# Patient Record
Sex: FEMALE | Race: WHITE | Employment: FULL TIME | URBAN - METROPOLITAN AREA
[De-identification: names, ages, dates, MRNs, and addresses within clinical notes are randomized per-mention and may not be internally consistent; named-entity substitution may affect disease eponyms.]

---

## 2017-06-01 ENCOUNTER — OFFICE VISIT (OUTPATIENT)
Dept: INTERNAL MEDICINE | Facility: CLINIC | Age: 27
End: 2017-06-01
Payer: COMMERCIAL

## 2017-06-01 VITALS
SYSTOLIC BLOOD PRESSURE: 117 MMHG | HEIGHT: 66 IN | TEMPERATURE: 98.3 F | DIASTOLIC BLOOD PRESSURE: 79 MMHG | BODY MASS INDEX: 21.9 KG/M2 | OXYGEN SATURATION: 95 % | WEIGHT: 136.25 LBS | HEART RATE: 65 BPM

## 2017-06-01 DIAGNOSIS — K21.9 GERD WITHOUT ESOPHAGITIS: Primary | ICD-10-CM

## 2017-06-01 PROCEDURE — 99203 OFFICE O/P NEW LOW 30 MIN: CPT | Performed by: NURSE PRACTITIONER

## 2017-06-01 NOTE — NURSING NOTE
"Chief Complaint   Patient presents with     New Patient     establish care       Initial /79 (BP Location: Right arm, Patient Position: Chair, Cuff Size: Adult Regular)  Pulse 65  Temp 98.3  F (36.8  C) (Oral)  Ht 5' 6\" (1.676 m)  Wt 136 lb 4 oz (61.8 kg)  SpO2 95%  BMI 21.99 kg/m2 Estimated body mass index is 21.99 kg/(m^2) as calculated from the following:    Height as of this encounter: 5' 6\" (1.676 m).    Weight as of this encounter: 136 lb 4 oz (61.8 kg).  Medication Reconciliation: complete     Lexus Mcdonald MA    "

## 2017-06-01 NOTE — MR AVS SNAPSHOT
"              After Visit Summary   2017    Jyotsna Park    MRN: [id number]           Patient Information     Date Of Birth          1990        Visit Information        Provider Department      2017 9:20 AM Asha Toure NP [facility]        Today's Diagnoses     GERD without esophagitis    -  1       Follow-ups after your visit        Future tests that were ordered for you today     Open Future Orders        Priority Expected Expires Ordered    H Pylori antigen stool Routine  2017            Who to contact     If you have questions or need follow up information about today's clinic visit or your schedule please contact [facility] directly at [phone].  Normal or non-critical lab and imaging results will be communicated to you by MyChart, letter or phone within 4 business days after the clinic has received the results. If you do not hear from us within 7 days, please contact the clinic through MyChart or phone. If you have a critical or abnormal lab result, we will notify you by phone as soon as possible.  Submit refill requests through [employer] or call your pharmacy and they will forward the refill request to us. Please allow 3 business days for your refill to be completed.          Additional Information About Your Visit        MyChart Information     [employer] lets you send messages to your doctor, view your test results, renew your prescriptions, schedule appointments and more. To sign up, go to [email or web address] . Click on \"Log in\" on the left side of the screen, which will take you to the Welcome page. Then click on \"Sign up Now\" on the right side of the page.     You will be asked to enter the access code listed below, as well as some personal information. Please follow the directions to create your username and password.     Your access code is: [email or web address]  Expires: 2017 10:26 AM     Your access code will  in 90 days. " "If you need help or a new code, please call your [facility] or [phone].        Care EveryWhere ID     This is your Care EveryWhere ID. This could be used by other organizations to access your [facility] medical records  [id number]        Your Vitals Were     Pulse Temperature Height Pulse Oximetry BMI (Body Mass Index)       65 98.3  F (36.8  C) (Oral) 5' 6\" (1.676 m) 95% 21.99 kg/m2        Blood Pressure from Last 3 Encounters:   06/01/17 117/79    Weight from Last 3 Encounters:   06/01/17 136 lb 4 oz (61.8 kg)                 Today's Medication Changes          These changes are accurate as of: 6/1/17 10:26 AM.  If you have any questions, ask your nurse or doctor.               Start taking these medicines.        Dose/Directions    omeprazole 20 MG CR capsule   Commonly known as:  priLOSEC   Used for:  GERD without esophagitis   Started by:  Asha Toure NP        Dose:  20 mg   Take 1 capsule (20 mg) by mouth daily   Quantity:  90 capsule   Refills:  1            Where to get your medicines      These medications were sent to [facility] [address] [place] [address]   [address] [facility] [address]     Phone:  [phone]     omeprazole 20 MG CR capsule                Primary Care Provider Office Phone # Fax #    Asha Toure [phone]       [facility] [address] [place] [address]        Thank you!     Thank you for choosing [facility]  for your care. Our goal is always to provide you with excellent care. Hearing back from our patients is one way we can continue to improve our services. Please take a few minutes to complete the written survey that you may receive in the mail after your visit with us. Thank you!             Your Updated Medication List - Protect others around you: Learn how to safely use, store and throw away your medicines at www.disposemymeds.org.          This list is accurate as of: " 6/1/17 10:26 AM.  Always use your most recent med list.                   Brand Name Dispense Instructions for use    omeprazole 20 MG CR capsule    priLOSEC    90 capsule    Take 1 capsule (20 mg) by mouth daily       ZOLOFT PO      Take 25 mg by mouth daily

## 2017-06-01 NOTE — PROGRESS NOTES
"  SUBJECTIVE:                                                    Jyotsna Park is a 27 year old female who presents to clinic today for the following health issues:    Presents with mother and boyfriend    New Patient/Transfer of Care    GERD/Heartburn      Duration: \"years\"    Description (location/character/radiation): sharp epigastric pain after overeating, fatty/fried foods, ETOH, mom reports pt will curl into fetal position.    Intensity:  moderate, severe    Accompanying signs and symptoms:  food getting stuck: no   nausea/vomiting/blood: no   abdominal pain: YES- epigastric, does not radiate  black/tarry or bloody stools: no :    History (similar episodes/previous evaluation): None    Precipitating or alleviating factors:  worse with fatty foods and alcohol.  current NSAID/Aspirin use: no     Therapies tried and outcome: none       Patient Active Problem List   Diagnosis     Depressive disorder     GERD without esophagitis     History reviewed. No pertinent surgical history.    Social History   Substance Use Topics     Smoking status: Never Smoker     Smokeless tobacco: Not on file     Alcohol use Yes      Comment: 7 per week     Family History   Problem Relation Age of Onset     CEREBROVASCULAR DISEASE Father      Substance Abuse Father      Asthma Father      Hypertension Maternal Grandmother      CEREBROVASCULAR DISEASE Maternal Grandmother      Hyperlipidemia Maternal Grandmother      Substance Abuse Maternal Grandmother      Hypertension Maternal Grandfather      CEREBROVASCULAR DISEASE Maternal Grandfather      Hyperlipidemia Maternal Grandfather      DIABETES Maternal Grandfather      Hyperlipidemia Paternal Grandmother      CEREBROVASCULAR DISEASE Paternal Grandmother      Asthma Paternal Grandmother      Substance Abuse Paternal Grandfather      Other Cancer Paternal Grandfather      Asthma Paternal Grandfather          Current Outpatient Prescriptions   Medication Sig Dispense Refill     " "omeprazole (PRILOSEC) 20 MG CR capsule Take 1 capsule (20 mg) by mouth daily 90 capsule 1     Sertraline HCl (ZOLOFT PO) Take 25 mg by mouth daily       BP Readings from Last 3 Encounters:   06/01/17 117/79    Wt Readings from Last 3 Encounters:   06/01/17 136 lb 4 oz (61.8 kg)                    Reviewed and updated as needed this visit by clinical staff  Tobacco  Allergies       Reviewed and updated as needed this visit by Provider         ROS:  C: NEGATIVE for fever, chills, change in weight  E/M: NEGATIVE for ear, mouth and throat problems  R: NEGATIVE for significant cough or SOB  CV: NEGATIVE for chest pain, palpitations or peripheral edema  : normal menstrual cycles  MUSCULOSKELETAL: NEGATIVE for significant arthralgias or myalgia  HEME/ALLERGY/IMMUNE: NEGATIVE for bleeding problems  ROS otherwise negative    OBJECTIVE:                                                    /79 (BP Location: Right arm, Patient Position: Chair, Cuff Size: Adult Regular)  Pulse 65  Temp 98.3  F (36.8  C) (Oral)  Ht 5' 6\" (1.676 m)  Wt 136 lb 4 oz (61.8 kg)  SpO2 95%  BMI 21.99 kg/m2  Body mass index is 21.99 kg/(m^2).  GENERAL: healthy, alert and no distress  NECK: no adenopathy, no asymmetry, masses, or scars and thyroid normal to palpation  RESP: lungs clear to auscultation - no rales, rhonchi or wheezes  CV: regular rate and rhythm, normal S1 S2, no S3 or S4, no murmur, click or rub, no peripheral edema and peripheral pulses strong  ABDOMEN: soft, nontender, no hepatosplenomegaly, no masses and bowel sounds normal  MS: no gross musculoskeletal defects noted, no edema  PSYCH: mentation appears normal, affect normal/bright         ASSESSMENT/PLAN:                                                              ICD-10-CM    1. GERD without esophagitis K21.9 H Pylori antigen stool     omeprazole (PRILOSEC) 20 MG CR capsule       F/u pending lab results, PPI trial  Lifestyle changes  Consider EGD if not improving 2-4 " "weeks  Pt and boyfriend state comfort with plan, mom voices concerns about need for more testing, aggressive treatment, \"how about an ultrasound?\".  Reassured US not useful for GERD, gastritis management.      Asha Toure NP  [facility]    "

## 2017-06-07 ENCOUNTER — CARE COORDINATION (OUTPATIENT)
Dept: SURGERY | Facility: CLINIC | Age: 27
End: 2017-06-07

## 2017-06-07 ENCOUNTER — HOSPITAL ENCOUNTER (OUTPATIENT)
Dept: MRI IMAGING | Facility: CLINIC | Age: 27
Discharge: HOME | End: 2017-06-07
Attending: SURGERY | Admitting: SURGERY
Payer: COMMERCIAL

## 2017-06-07 DIAGNOSIS — R16.0 LIVER MASS: ICD-10-CM

## 2017-06-07 PROCEDURE — 74183 MRI ABD W/O CNTR FLWD CNTR: CPT

## 2017-06-07 PROCEDURE — 25500064 ZZH RX 255 OP 636: Performed by: SURGERY

## 2017-06-07 PROCEDURE — A9581 GADOXETATE DISODIUM INJ: HCPCS | Performed by: SURGERY

## 2017-06-07 RX ADMIN — GADOXETATE DISODIUM 6.5 ML: 181.43 INJECTION, SOLUTION INTRAVENOUS at 08:04

## 2017-06-07 NOTE — PROGRESS NOTES
Care Coordination Telephone Call  GI Service and Surgical Oncology    Called patient to discuss results of MRI at the request of Dr. Park.  I have read impression results to the patient.  We will see her in clinic on Friday at 1 pm and I have confirmed that appointment.    I have asked the patient to call with any additional questions or concerns and have provided my contact information.    Ana NARVAEZN, HNBC, STAR-T  RN Care Coordinator  Surgical Oncology and GI service  Ph: [phone]  FAX: [phone]

## 2017-06-09 ENCOUNTER — OFFICE VISIT (OUTPATIENT)
Dept: SURGERY | Facility: CLINIC | Age: 27
End: 2017-06-09
Attending: SURGERY
Payer: COMMERCIAL

## 2017-06-09 VITALS
HEART RATE: 60 BPM | OXYGEN SATURATION: 95 % | DIASTOLIC BLOOD PRESSURE: 66 MMHG | WEIGHT: 139.99 LBS | SYSTOLIC BLOOD PRESSURE: 113 MMHG | BODY MASS INDEX: 22.6 KG/M2 | TEMPERATURE: 98.4 F | RESPIRATION RATE: 16 BRPM

## 2017-06-09 DIAGNOSIS — K76.89 FOCAL NODULAR HYPERPLASIA OF LIVER: ICD-10-CM

## 2017-06-09 DIAGNOSIS — R16.0 LIVER MASS: Primary | ICD-10-CM

## 2017-06-09 PROCEDURE — 99212 OFFICE O/P EST SF 10 MIN: CPT

## 2017-06-09 ASSESSMENT — PAIN SCALES - GENERAL: PAINLEVEL: NO PAIN (0)

## 2017-06-09 NOTE — MR AVS SNAPSHOT
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  She would like to call to schedule f/u MRI and I have asked her to let me know when this has been completed so that Dr. Park can review.    Ana Spears  BSN, HNBC, STAR-T  RN Care Coordinator  Ph: [phone]  FAX: [phone]            Follow-ups after your visit        Follow-up notes from your care team     Return in about 3 months (around 9/9/2017).      Who to contact     If you have questions or need follow up information about today's clinic visit or your schedule please contact [facility] directly at [phone].  Normal or non-critical lab and imaging results will be communicated to you by [employer], letter or phone within 4 business days after the clinic has received the results. If you do not hear from us within 7 days, please contact the clinic through [employer] or phone. If you have a critical or abnormal lab result, we will notify you by phone as soon as possible.  Submit refill requests through [employer] or call your pharmacy and they will forward the refill request to us. Please allow 3 business days for your refill to be completed.          Additional Information About Your Visit        [employer] Information     [employer] lets you send messages to your doctor, view your test results, renew your prescriptions, schedule appointments and more. To sign up, go to [email or web address] . Click on \"Log in\" on the left side of the screen, which will take you to the Welcome page. Then click on \"Sign up Now\" on the right side of the " page.     You will be asked to enter the access code listed below, as well as some personal information. Please follow the directions to create your username and password.     Your access code is: [email or web address]  Expires: 2017 10:26 AM     Your access code will  in 90 days. If you need help or a new code, please call your [facility] or [phone].        Care EveryWhere ID     This is your Care EveryWhere ID. This could be used by other organizations to access your [facility] medical records  [id number]        Your Vitals Were     Pulse Temperature Respirations Pulse Oximetry BMI (Body Mass Index)       60 98.4  F (36.9  C) (Oral) 16 95% 22.6 kg/m2        Blood Pressure from Last 3 Encounters:   17 113/66   17 117/79    Weight from Last 3 Encounters:   17 63.5 kg (139 lb 15.9 oz)   17 61.8 kg (136 lb 4 oz)                 Today's Medication Changes          These changes are accurate as of: 17 11:59 PM.  If you have any questions, ask your nurse or doctor.               Stop taking these medicines if you haven't already. Please contact your care team if you have questions.     omeprazole 20 MG CR capsule   Commonly known as:  priLOSEC   Stopped by:  Barron Park MD                    Primary Care Provider Office Phone # Fax #    Asha Toure [phone]       [facility] [address] [place] [address]        Thank you!     Thank you for choosing [facility] [place]  for your care. Our goal is always to provide you with excellent care. Hearing back from our patients is one way we can continue to improve our services. Please take a few minutes to complete the written survey that you may receive in the mail after your visit with us. Thank you!             Your Updated Medication List - Protect others around you: Learn how to safely use, store and throw away your medicines at www.disposemymeds.org.          This  list is accurate as of: 6/9/17 11:59 PM.  Always use your most recent med list.                   Brand Name Dispense Instructions for use    ZOLOFT PO      Take 25 mg by mouth daily

## 2017-06-09 NOTE — LETTER
6/9/2017       RE: Jyotsna Park  [address] [address]     Dear Colleague,    Thank you for referring your patient, Jyotsna Park, to the [facility]. Please see a copy of my visit note below.    REASON FOR EVALUATION:  Newly identified liver mass consistent with focal nodular hyperplasia.      HISTORY:  Ms. 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  She subsequently underwent an MRI, which was performed a couple of days ago and reveals a large mass arising in the left lateral segment of the liver consistent with focal nodular hyperplasia.  The mass on my review measures about 8.6 cm in greatest dimension when viewed on the coronal sections.  On axial imaging, the mass measured 7 x 5.7 cm in greatest dimension.  The findings were read as consistent with focal nodular hyperplasia, including findings of a central scar.  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      With regards to symptoms, Ms. Park does have occasional abdominal pains after eating a lot or consuming too much alcohol.  She has taken some omeprazole in the past, but essentially states that those symptoms go away if she does overeat or over drink.  She does not have any chronic pain or other findings that seem consistent with symptoms from focal nodular hyperplasia.      PAST MEDICAL HISTORY:  The patient takes Zoloft for depression.      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  She is a nonsmoker and uses alcohol  socially.      PHYSICAL EXAMINATION:   HEENT:  The sclerae are nonicteric.   SKIN:  Not jaundiced.   ABDOMEN:  Soft and benign.  She has some minimal tenderness on deep palpation of the left upper quadrant, but I cannot palpate the mass that is seen on MRI, and overall her exam is benign.      ASSESSMENT AND PLAN:  Ms. Park is a 27-year-old female with a newly identified focal nodular hyperplasia of the liver.  In our discussion today she states that she was previously taking oral contraceptive pills, but I did discuss with her that focal nodular hyperplasias are not typically estrogen sensitive, and I think it would be reasonable to continue those if she desired.  With regard to followup and/or management of this lesion, I did discuss clearly with Ms. Park that normally I would tell patients that focal nodular hyperplasia that is asymptomatic really does not need surveillance; however, because she has had abdominal imaging in the past which did not show any lesion in the liver, I think followup in her case is reasonable, only to be sure that this is not a rapidly growing lesion.  I briefly touched on the possibility of a much more rare condition, such as fibrolamellar variant of hepatocellular cancer, although this lesion really did not have any of those features and meets the classic criteria based on radiologic interpretation for focal nodular hyperplasia.  Based on that, we discussed and agreed together that we would do short-term followup in 3 months with a repeat MRI just to be certain that rapid growth is not occurring.  After that, I think I would probably check her 1 year later and maybe for a few years thereafter just to be sure that this lesion remains stable.  If so, no surgical intervention will be necessary.  If, however, the lesion does show evidence of rapid growth, I recommended towards removal, which she is agreeable to.  On that note, I did briefly discuss the possibility of a laparoscopic  left lateral segment liver resection with the patient just so she would be aware of those details.      I will look forward to seeing Ms. Park again in about 3 months' time with a repeat MRI for her newly identified focal nodular hyperplasia of the liver.        A total of 45 minutes was spent with Ms. Park and her mom today, all of which was in consultation.         Again, thank you for allowing me to participate in the care of your patient.      Sincerely,    Barron Park MD

## 2017-06-09 NOTE — PATIENT INSTRUCTIONS
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  She would like to call to schedule f/u MRI and I have asked her to let me know when this has been completed so that Dr. Park can review.    Ana NARVAEZN, [facility], STAR-T  RN Care Coordinator  Ph: [phone]  FAX: [phone]

## 2017-06-09 NOTE — PROGRESS NOTES
REASON FOR EVALUATION:  Newly identified liver mass consistent with focal nodular hyperplasia.      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  She subsequently underwent an MRI, which was performed a couple of days ago and reveals a large mass arising in the left lateral segment of the liver consistent with focal nodular hyperplasia.  The mass on my review measures about 8.6 cm in greatest dimension when viewed on the coronal sections.  On axial imaging, the mass measured 7 x 5.7 cm in greatest dimension.  The findings were read as consistent with focal nodular hyperplasia, including findings of a central scar.  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      With regards to symptoms, Ms. Park does have occasional abdominal pains after eating a lot or consuming too much alcohol.  She has taken some omeprazole in the past, but essentially states that those symptoms go away if she does overeat or over drink.  She does not have any chronic pain or other findings that seem consistent with symptoms from focal nodular hyperplasia.      PAST MEDICAL HISTORY:  The patient takes Zoloft for depression.      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  She is a nonsmoker and uses alcohol socially.      PHYSICAL EXAMINATION:   HEENT:  The sclerae are nonicteric.   SKIN:  Not jaundiced.   ABDOMEN:  Soft and benign.  She has some minimal tenderness on deep palpation of the left upper quadrant, but I cannot palpate the  mass that is seen on MRI, and overall her exam is benign.      ASSESSMENT AND PLAN:  Ms. Park is a 27-year-old female with a newly identified focal nodular hyperplasia of the liver.  In our discussion today she states that she was previously taking oral contraceptive pills, but I did discuss with her that focal nodular hyperplasias are not typically estrogen sensitive, and I think it would be reasonable to continue those if she desired.  With regard to followup and/or management of this lesion, I did discuss clearly with Ms. Park that normally I would tell patients that focal nodular hyperplasia that is asymptomatic really does not need surveillance; however, because she has had abdominal imaging in the past which did not show any lesion in the liver, I think followup in her case is reasonable, only to be sure that this is not a rapidly growing lesion.  I briefly touched on the possibility of a much more rare condition, such as fibrolamellar variant of hepatocellular cancer, although this lesion really did not have any of those features and meets the classic criteria based on radiologic interpretation for focal nodular hyperplasia.  Based on that, we discussed and agreed together that we would do short-term followup in 3 months with a repeat MRI just to be certain that rapid growth is not occurring.  After that, I think I would probably check her 1 year later and maybe for a few years thereafter just to be sure that this lesion remains stable.  If so, no surgical intervention will be necessary.  If, however, the lesion does show evidence of rapid growth, I recommended towards removal, which she is agreeable to.  On that note, I did briefly discuss the possibility of a laparoscopic left lateral segment liver resection with the patient just so she would be aware of those details.      I will look forward to seeing Ms. Park again in about 3 months' time with a repeat MRI for her newly identified focal nodular  hyperplasia of the liver.        A total of 45 minutes was spent with Ms. Park and her mom today, all of which was in consultation.

## 2017-08-22 ENCOUNTER — TELEPHONE (OUTPATIENT)
Dept: INTERNAL MEDICINE | Facility: CLINIC | Age: 27
End: 2017-08-22

## 2017-08-22 NOTE — LETTER
[facility]  [address], [address]  [place], [place]  [address]                                            TEL:[phone]  FAX:[phone]      Jyotsna Park  [address] [address]      September 11, 2017    Dear Jyotsna,          At [facility], we care about your health and well-being. A review of your chart has indicated that you are due for a pap. Please contact us at [phone] to schedule an appointment.     If you have already had one or all of the above screening tests at another facility, please call us to update your chart.        Sincerely,      Asha Toure C.N.P.

## 2017-10-12 ENCOUNTER — CARE COORDINATION (OUTPATIENT)
Dept: SURGERY | Facility: CLINIC | Age: 27
End: 2017-10-12

## 2017-10-12 NOTE — PROGRESS NOTES
Surgical Oncology RN Care Coordination Note:     Called and left a message for patient to call back to discuss scheduling. Informed her in message that Dr. Park would recommend she reschedule appointment and follow up scan. Informed her our schedulers have been attempting to reach her to assist with this. Left my direct number for patient to call back and will mail a letter to patient with recommendations.     Susana Sanders RN, BSN  Care Coordinator - Dr. Park  [phone]

## 2017-10-12 NOTE — LETTER
October 12, 2017       TO: Jyotsna Park  [address] [facility] [address]         Dear Jyotsna,    We missed you at your appointment on 10/2/2017. Our records indicated you are overdue for follow up with Dr. Park with a repeat MRI scan. Dr. Park does recommend we reschedule these appointments. Our office has attempted to reach you on multiple occasions without success. If you would like to reschedule this appointment and MRI please contact our office at [phone] and ask to speak with Dr. Park's clinic scheduler.     If you have any questions regarding this letter or recommendations please contact our office directly at [phone].     Sincerely,    Susana Sanders RN, BSN  Care Coordinator - Dr. Park  [phone]

## 2017-12-18 ENCOUNTER — OFFICE VISIT (OUTPATIENT)
Dept: SURGERY | Facility: CLINIC | Age: 27
End: 2017-12-18
Attending: SURGERY
Payer: COMMERCIAL

## 2017-12-18 VITALS
HEART RATE: 55 BPM | TEMPERATURE: 98.4 F | WEIGHT: 138.23 LBS | DIASTOLIC BLOOD PRESSURE: 54 MMHG | OXYGEN SATURATION: 96 % | RESPIRATION RATE: 16 BRPM | BODY MASS INDEX: 22.31 KG/M2 | SYSTOLIC BLOOD PRESSURE: 92 MMHG

## 2017-12-18 DIAGNOSIS — K76.89 FOCAL NODULAR HYPERPLASIA OF LIVER: Primary | ICD-10-CM

## 2017-12-18 PROCEDURE — 99212 OFFICE O/P EST SF 10 MIN: CPT | Mod: ZF

## 2017-12-18 RX ORDER — DROSPIRENONE AND ETHINYL ESTRADIOL 0.02-3(28)
KIT ORAL
COMMUNITY
Start: 2017-12-13

## 2017-12-18 ASSESSMENT — PAIN SCALES - GENERAL: PAINLEVEL: NO PAIN (0)

## 2017-12-18 NOTE — PROGRESS NOTES
Looks great  Feels well  No new pains or problems  MRI with stable FNH    I think its OK to resume BCPs but non estrogen formulas may be preferred    Will plan US in 1 yr just to be sure no change on BCPs

## 2017-12-18 NOTE — MR AVS SNAPSHOT
After Visit Summary   12/18/2017    Jyotsna Park    MRN: [id number]           Patient Information     Date Of Birth          1990        Visit Information        Provider Department      12/18/2017 1:00 PM Barron Park MD [facility]        Today's Diagnoses     Focal nodular hyperplasia of liver    -  1       Follow-ups after your visit        Who to contact     If you have questions or need follow up information about today's clinic visit or your schedule please contact [facility] directly at [phone].  Normal or non-critical lab and imaging results will be communicated to you by Storitzhart, letter or phone within 4 business days after the clinic has received the results. If you do not hear from us within 7 days, please contact the clinic through Plickerst or phone. If you have a critical or abnormal lab result, we will notify you by phone as soon as possible.  Submit refill requests through Sokikom or call your pharmacy and they will forward the refill request to us. Please allow 3 business days for your refill to be completed.          Additional Information About Your Visit        MyChart Information     Sokikom gives you secure access to your electronic health record. If you see a primary care provider, you can also send messages to your care team and make appointments. If you have questions, please call your primary care clinic.  If you do not have a primary care provider, please call [phone] and they will assist you.        Care EveryWhere ID     This is your Care EveryWhere ID. This could be used by other organizations to access your [place] medical records  [id number]        Your Vitals Were     Pulse Temperature Respirations Pulse Oximetry BMI (Body Mass Index)       55 98.4  F (36.9  C) (Oral) 16 96% 22.31 kg/m2        Blood Pressure from Last 3 Encounters:   12/18/17 92/54   06/09/17 113/66   06/01/17 117/79    Weight from Last 3  Encounters:   12/18/17 62.7 kg (138 lb 3.7 oz)   06/09/17 63.5 kg (139 lb 15.9 oz)   06/01/17 61.8 kg (136 lb 4 oz)              Today, you had the following     No orders found for display       Primary Care Provider Office Phone # Fax #    Asha ToureMARTAH [phone]       [address] [facility] [address]        Equal Access to Services     [facility] : Hadii aad ku hadasho Soomaali, waaxda luqadaha, qaybta kaalmada adeegyada, waxay idiin hayaan adeeg kharash la'abelino . So [facility] [phone].    ATENCIÓN: Si habla español, tiene a welsh disposición servicios gratuitos de asistencia lingüística. [facility] [phone].    We comply with applicable federal civil rights laws and [place] laws. We do not discriminate on the basis of race, color, national origin, age, disability, sex, sexual orientation, or gender identity.            Thank you!     Thank you for choosing [facility]  for your care. Our goal is always to provide you with excellent care. Hearing back from our patients is one way we can continue to improve our services. Please take a few minutes to complete the written survey that you may receive in the mail after your visit with us. Thank you!             Your Updated Medication List - Protect others around you: Learn how to safely use, store and throw away your medicines at www.disposemymeds.org.          This list is accurate as of: 12/18/17  1:51 PM.  Always use your most recent med list.                   Brand Name Dispense Instructions for use Diagnosis    drospirenone-ethinyl estradiol 3-0.02 MG per tablet    IGNACIO     Take 1 pill by mouth continuous cycling for 3 months then withdrawl bleed.        ZOLOFT PO      Take 25 mg by mouth daily

## 2017-12-18 NOTE — NURSING NOTE
"Oncology Rooming Note    December 18, 2017 1:29 PM   Jyotsna Park is a 27 year old female who presents for:    Chief Complaint   Patient presents with     Oncology Clinic Visit     Retirm for Hepatic Mass      Initial Vitals: BP 92/54  Pulse 55  Temp 98.4  F (36.9  C) (Oral)  Resp 16  Wt 62.7 kg (138 lb 3.7 oz)  SpO2 96%  BMI 22.31 kg/m2 Estimated body mass index is 22.31 kg/(m^2) as calculated from the following:    Height as of 6/1/17: 1.676 m (5' 6\").    Weight as of this encounter: 62.7 kg (138 lb 3.7 oz). Body surface area is 1.71 meters squared.  No Pain (0) Comment: Data Unavailable   No LMP recorded. Patient is not currently having periods (Reason: Birth Control).  Allergies reviewed: Yes  Medications reviewed: Yes    Medications: Medication refills not needed today.  Pharmacy name entered into [facility]:    [facility] [address] [place], [place] - [address]  [facility] [address] [place] [facility] [address]    Clinical concerns: Results  Xavier  was notified.    6  minutes for nursing intake (face to face time)     Sherrill Munoz MA              "

## 2017-12-18 NOTE — Clinical Note
Patient needs 1 year follow up with Dr. Park with an US of the liver prior to appointment. Can be same day if needed.

## 2017-12-18 NOTE — LETTER
12/18/2017       RE: Jyotsna Park  [address]  [place] [facility] [address]     Dear Colleague,    Thank you for referring your patient, Jyotsna Park, to the [facility]. Please see a copy of my visit note below.    Looks great  Feels well  No new pains or problems  MRI with stable FNH    I think its OK to resume BCPs but non estrogen formulas may be preferred    Will plan US in 1 yr just to be sure no change on BCPs    Again, thank you for allowing me to participate in the care of your patient.      Sincerely,    Barron Park MD

## 2017-12-31 ENCOUNTER — HEALTH MAINTENANCE LETTER (OUTPATIENT)
Age: 27
End: 2017-12-31

## 2018-04-16 ENCOUNTER — TELEPHONE (OUTPATIENT)
Dept: INTERNAL MEDICINE | Facility: CLINIC | Age: 28
End: 2018-04-16

## 2018-04-16 NOTE — TELEPHONE ENCOUNTER
Panel Management Review      Patient has the following on her problem list: None      Composite cancer screening  Chart review shows that this patient is due/due soon for the following Pap Smear  Summary:    Patient is due/failing the following:   PAP    Action needed:   Patient needs office visit for Pap.    Type of outreach:    Sent letter.    Questions for provider review:    None                                                                                                                                    KENNEDY JOHNSON CMA       Chart routed to no one   .

## 2018-08-28 ENCOUNTER — TELEPHONE (OUTPATIENT)
Dept: INTERNAL MEDICINE | Facility: CLINIC | Age: 28
End: 2018-08-28

## 2018-08-28 NOTE — TELEPHONE ENCOUNTER
8/28/2018    Call Regarding Preventive Health Screening Cervical/PAP       Attempt 1    Message on voicemail     Comments:       Outreach   SV

## 2018-09-07 NOTE — TELEPHONE ENCOUNTER
9/7/2018    Call Regarding Preventive Health Screening Cervical/PAP    Attempt 2    Message on voicemail     Comments:       Outreach   CC

## 2018-10-31 NOTE — TELEPHONE ENCOUNTER
10/31/2018    Call Regarding Preventive Health Screening Cervical/PAP    Attempt 3    Message on voicemail     Comments:       Outreach   CWR

## 2018-11-14 ENCOUNTER — TELEPHONE (OUTPATIENT)
Dept: SURGERY | Facility: CLINIC | Age: 28
End: 2018-11-14

## 2018-12-27 ENCOUNTER — APPOINTMENT (OUTPATIENT)
Dept: CT IMAGING | Facility: CLINIC | Age: 28
End: 2018-12-27
Attending: EMERGENCY MEDICINE
Payer: COMMERCIAL

## 2018-12-27 ENCOUNTER — APPOINTMENT (OUTPATIENT)
Dept: ULTRASOUND IMAGING | Facility: CLINIC | Age: 28
End: 2018-12-27
Attending: EMERGENCY MEDICINE
Payer: COMMERCIAL

## 2018-12-27 ENCOUNTER — HOSPITAL ENCOUNTER (EMERGENCY)
Facility: CLINIC | Age: 28
Discharge: HOME | End: 2018-12-27
Attending: EMERGENCY MEDICINE | Admitting: EMERGENCY MEDICINE
Payer: COMMERCIAL

## 2018-12-27 VITALS
RESPIRATION RATE: 18 BRPM | WEIGHT: 135 LBS | HEART RATE: 67 BPM | DIASTOLIC BLOOD PRESSURE: 87 MMHG | BODY MASS INDEX: 21.69 KG/M2 | OXYGEN SATURATION: 98 % | SYSTOLIC BLOOD PRESSURE: 126 MMHG | TEMPERATURE: 98.6 F | HEIGHT: 66 IN

## 2018-12-27 DIAGNOSIS — N20.0 KIDNEY STONE: ICD-10-CM

## 2018-12-27 LAB
ALBUMIN UR-MCNC: 30 MG/DL
AMORPH CRY #/AREA URNS HPF: ABNORMAL /HPF
ANION GAP SERPL CALCULATED.3IONS-SCNC: 8 MMOL/L (ref 3–14)
APPEARANCE UR: ABNORMAL
BACTERIA #/AREA URNS HPF: ABNORMAL /HPF
BASOPHILS # BLD AUTO: 0 10E9/L (ref 0–0.2)
BASOPHILS NFR BLD AUTO: 0.3 %
BILIRUB UR QL STRIP: NEGATIVE
BUN SERPL-MCNC: 24 MG/DL (ref 7–30)
CALCIUM SERPL-MCNC: 9.1 MG/DL (ref 8.5–10.1)
CHLORIDE SERPL-SCNC: 105 MMOL/L (ref 94–109)
CO2 SERPL-SCNC: 25 MMOL/L (ref 20–32)
COLOR UR AUTO: YELLOW
CREAT SERPL-MCNC: 1.23 MG/DL (ref 0.52–1.04)
DIFFERENTIAL METHOD BLD: NORMAL
EOSINOPHIL # BLD AUTO: 0.1 10E9/L (ref 0–0.7)
EOSINOPHIL NFR BLD AUTO: 1.1 %
ERYTHROCYTE [DISTWIDTH] IN BLOOD BY AUTOMATED COUNT: 11.9 % (ref 10–15)
GFR SERPL CREATININE-BSD FRML MDRD: 60 ML/MIN/{1.73_M2}
GLUCOSE SERPL-MCNC: 104 MG/DL (ref 70–99)
GLUCOSE UR STRIP-MCNC: NEGATIVE MG/DL
HCG UR QL: NEGATIVE
HCT VFR BLD AUTO: 44.7 % (ref 35–47)
HGB BLD-MCNC: 14.8 G/DL (ref 11.7–15.7)
HGB UR QL STRIP: ABNORMAL
IMM GRANULOCYTES # BLD: 0 10E9/L (ref 0–0.4)
IMM GRANULOCYTES NFR BLD: 0.2 %
KETONES UR STRIP-MCNC: NEGATIVE MG/DL
LEUKOCYTE ESTERASE UR QL STRIP: NEGATIVE
LYMPHOCYTES # BLD AUTO: 3 10E9/L (ref 0.8–5.3)
LYMPHOCYTES NFR BLD AUTO: 34.7 %
MCH RBC QN AUTO: 31.3 PG (ref 26.5–33)
MCHC RBC AUTO-ENTMCNC: 33.1 G/DL (ref 31.5–36.5)
MCV RBC AUTO: 95 FL (ref 78–100)
MONOCYTES # BLD AUTO: 0.7 10E9/L (ref 0–1.3)
MONOCYTES NFR BLD AUTO: 7.6 %
MUCOUS THREADS #/AREA URNS LPF: PRESENT /LPF
NEUTROPHILS # BLD AUTO: 4.9 10E9/L (ref 1.6–8.3)
NEUTROPHILS NFR BLD AUTO: 56.1 %
NITRATE UR QL: NEGATIVE
NRBC # BLD AUTO: 0 10*3/UL
NRBC BLD AUTO-RTO: 0 /100
PH UR STRIP: 5 PH (ref 5–7)
PLATELET # BLD AUTO: 262 10E9/L (ref 150–450)
POTASSIUM SERPL-SCNC: 3.7 MMOL/L (ref 3.4–5.3)
RBC # BLD AUTO: 4.73 10E12/L (ref 3.8–5.2)
RBC #/AREA URNS AUTO: >182 /HPF (ref 0–2)
SODIUM SERPL-SCNC: 138 MMOL/L (ref 133–144)
SOURCE: ABNORMAL
SP GR UR STRIP: 1.03 (ref 1–1.03)
SQUAMOUS #/AREA URNS AUTO: 6 /HPF (ref 0–1)
UROBILINOGEN UR STRIP-MCNC: 0 MG/DL (ref 0–2)
WBC # BLD AUTO: 8.7 10E9/L (ref 4–11)
WBC #/AREA URNS AUTO: 13 /HPF (ref 0–5)

## 2018-12-27 PROCEDURE — 85025 COMPLETE CBC W/AUTO DIFF WBC: CPT | Performed by: EMERGENCY MEDICINE

## 2018-12-27 PROCEDURE — 93976 VASCULAR STUDY: CPT

## 2018-12-27 PROCEDURE — 96374 THER/PROPH/DIAG INJ IV PUSH: CPT

## 2018-12-27 PROCEDURE — 99285 EMERGENCY DEPT VISIT HI MDM: CPT | Mod: 25

## 2018-12-27 PROCEDURE — 74176 CT ABD & PELVIS W/O CONTRAST: CPT

## 2018-12-27 PROCEDURE — 96375 TX/PRO/DX INJ NEW DRUG ADDON: CPT

## 2018-12-27 PROCEDURE — 81025 URINE PREGNANCY TEST: CPT | Performed by: EMERGENCY MEDICINE

## 2018-12-27 PROCEDURE — 25000128 H RX IP 250 OP 636: Performed by: EMERGENCY MEDICINE

## 2018-12-27 PROCEDURE — 25000132 ZZH RX MED GY IP 250 OP 250 PS 637: Performed by: EMERGENCY MEDICINE

## 2018-12-27 PROCEDURE — 81001 URINALYSIS AUTO W/SCOPE: CPT | Performed by: EMERGENCY MEDICINE

## 2018-12-27 PROCEDURE — 80048 BASIC METABOLIC PNL TOTAL CA: CPT | Performed by: EMERGENCY MEDICINE

## 2018-12-27 PROCEDURE — 96376 TX/PRO/DX INJ SAME DRUG ADON: CPT

## 2018-12-27 RX ORDER — ONDANSETRON 4 MG/1
4 TABLET, ORALLY DISINTEGRATING ORAL EVERY 8 HOURS PRN
Qty: 10 TABLET | Refills: 0 | Status: SHIPPED | OUTPATIENT
Start: 2018-12-27 | End: 2019-01-26

## 2018-12-27 RX ORDER — OXYCODONE AND ACETAMINOPHEN 5; 325 MG/1; MG/1
1-2 TABLET ORAL EVERY 4 HOURS PRN
Qty: 12 TABLET | Refills: 0 | Status: SHIPPED | OUTPATIENT
Start: 2018-12-27 | End: 2018-12-30

## 2018-12-27 RX ORDER — OXYCODONE HYDROCHLORIDE 5 MG/1
5 TABLET ORAL ONCE
Status: COMPLETED | OUTPATIENT
Start: 2018-12-27 | End: 2018-12-27

## 2018-12-27 RX ORDER — ONDANSETRON 2 MG/ML
4 INJECTION INTRAMUSCULAR; INTRAVENOUS EVERY 30 MIN PRN
Status: DISCONTINUED | OUTPATIENT
Start: 2018-12-27 | End: 2018-12-27 | Stop reason: HOSPADM

## 2018-12-27 RX ORDER — TAMSULOSIN HYDROCHLORIDE 0.4 MG/1
0.4 CAPSULE ORAL DAILY
Qty: 7 CAPSULE | Refills: 0 | Status: SHIPPED | OUTPATIENT
Start: 2018-12-27 | End: 2019-01-03

## 2018-12-27 RX ORDER — HYDROMORPHONE HYDROCHLORIDE 1 MG/ML
0.5 INJECTION, SOLUTION INTRAMUSCULAR; INTRAVENOUS; SUBCUTANEOUS
Status: COMPLETED | OUTPATIENT
Start: 2018-12-27 | End: 2018-12-27

## 2018-12-27 RX ADMIN — Medication 0.5 MG: at 04:20

## 2018-12-27 RX ADMIN — OXYCODONE HYDROCHLORIDE 5 MG: 5 TABLET ORAL at 01:50

## 2018-12-27 RX ADMIN — Medication 0.5 MG: at 05:18

## 2018-12-27 RX ADMIN — Medication 0.5 MG: at 02:44

## 2018-12-27 RX ADMIN — ONDANSETRON 4 MG: 2 INJECTION INTRAMUSCULAR; INTRAVENOUS at 02:44

## 2018-12-27 ASSESSMENT — ENCOUNTER SYMPTOMS
DYSURIA: 0
HEMATURIA: 0
VOMITING: 0
BACK PAIN: 1
NAUSEA: 1
ABDOMINAL PAIN: 1

## 2018-12-27 ASSESSMENT — MIFFLIN-ST. JEOR: SCORE: 1364.11

## 2018-12-27 NOTE — ED AVS SNAPSHOT
[facility] Emergency Department  [address]  [facility] [address]  Phone:  [phone]  Fax:  [phone]                                    Jyotsna Park   MRN: [id number]    Department:  [facility] Emergency Department   Date of Visit:  12/27/2018           After Visit Summary Signature Page    I have received my discharge instructions, and my questions have been answered. I have discussed any challenges I see with this plan with the nurse or doctor.    ..........................................................................................................................................  Patient/Patient Representative Signature      ..........................................................................................................................................  Patient Representative Print Name and Relationship to Patient    ..................................................               ................................................  Date                                   Time    ..........................................................................................................................................  Reviewed by Signature/Title    ...................................................              ..............................................  Date                                               Time          22EPIC Rev 08/18

## 2018-12-27 NOTE — DISCHARGE INSTRUCTIONS
Discharge Instructions  Kidney Stones    Kidney stones are a common problem that can cause a lot of pain but fortunately are usually not dangerous. Kidney stones form in the kidney and then can cause a blockage (obstruction) of the flow of urine from the kidney which leads to pain. Most patients can manage kidney stones at home (without a hospital stay).  However, sometimes your condition may be worse than it seemed at first, or may get worse with time. Most kidney stones will pass on their own, but occasionally stones may need to be removed by an urologist.    Generally, every Emergency Department visit should have a follow-up clinic visit with either a primary or a specialty clinic/provider. Please follow-up as instructed by your emergency provider today.      Return to the Emergency Department if:  Your pain is not controlled despite the medications provided or recommended.  You are vomiting (throwing up) and cannot keep fluids or medications down.  You develop a fever (>100.4 F).  You feel much more ill or develop new symptoms.  What can I do to help myself?  Be sure to drink plenty of fluids.  If instructed to do so, strain your urine (pee) with the urine strainer you were provided with today. Your stone may look like a grain of sand or a small pebble. Collect any stones in the cup provided and bring to your follow-up appointment.  Staying active is good, and may help the stone to pass. You may do whatever you feel up to doing without restrictions.   Treatment:  Non-steroidal anti-inflammatory drugs (NSAIDs). This includes prescription medicines like Toradol  (ketorolac) and non-prescription medicines like Advil  (ibuprofen) and Nuprin  (ibuprofen) and Naproxen. These pain relievers are very effective for kidney stones.  Nausea (sick to your stomach) medication.  Nausea and vomiting are common with kidney stones, so your provider may send you home with medicine for this.   Flomax  (tamsulosin). This medicine is  sometimes used for men with prostate problems, but also can help kidney stones to pass. Its effectiveness is controversial or questionable so it is prescribed in certain situations. This medicine can lower blood pressure, and you may feel faint/lightheaded, especially when you first stand up. Be sure to get up gradually, sit down if you feel faint, and avoid activity where feeling faint would be dangerous, such as climbing ladders.  If you were given a prescription for medicine here today, be sure to read all of the information (including the package insert) that comes with your prescription.  This will include important information about the medicine, its side effects, and any warnings that you need to know about.  The pharmacist who fills the prescription can provide more information and answer questions you may have about the medicine.  If you have questions or concerns that the pharmacist cannot address, please call or return to the Emergency Department.   Remember that you can always come back to the Emergency Department if you are not able to see your regular provider in the amount of time listed above, if you get any new symptoms, or if there is anything that worries you.

## 2018-12-27 NOTE — ED PROVIDER NOTES
"  History     Chief Complaint:  Pelvic Pain    HPI   Jyotsna Park is a 28 year old female who presents with pelvic pain. The patient reports she awoke from sleep this evening with constant left lower quadrant pain that radiates to the left lower back with associated nausea. She described the pain initially as an ache that progressively became more painful. She took 3 Advils and her pain subsided since she arrived, but states it is starting to act up again. The patient states she does have a history of UTIs but thinks that this felt different, therefore prompting her visit to the ED for further evaluation and treatment. Denies dysuria, hematuria, vomiting, or any other symptoms at this time. No previous history of cysts or prior surgeries.     Allergies:  No known drug allergies     Medications:    Drospireneone-ethinyl estradiol  Sertraline HCl    Past Medical History:    Depression  GERD    Past Surgical History:    History reviewed. No pertinent surgical history.     Family History:    Cerebrovascular disease  Substance abuse  Asthma    Social History:  Smoking status: Never smoker  Alcohol use: Yes   Marital Status:  Single [1]  PCP: Asha Toure  Accompanied to the ED by mother.     Review of Systems   Gastrointestinal: Positive for abdominal pain and nausea. Negative for vomiting.   Genitourinary: Positive for pelvic pain. Negative for dysuria and hematuria.   Musculoskeletal: Positive for back pain.   All other systems reviewed and are negative.    Physical Exam   Patient Vitals for the past 24 hrs:   BP Temp Temp src Pulse Resp SpO2 Height Weight   12/27/18 0245 126/87 -- -- -- -- -- -- --   12/27/18 0052 136/81 98.6  F (37  C) Temporal 67 18 98 % 1.676 m (5' 6\") 61.2 kg (135 lb)      Physical Exam  General: Patient is alert and interactive when I enter the room  Head:  The scalp, face, and head appear normal  Eyes:  Conjunctivae are normal  ENT:    The nose is normal    Pinnae are " normal    External acoustic canals are normal  Neck:  Trachea midline  CV:  Pulses are normal.    Resp:  No respiratory distress   Abdomen:      Soft, LLQ tenderness and left lower back tenderness, non-distended  Musc:  Normal muscular tone    No major joint effusions    No asymmetric leg swelling  Skin:  No rash or lesions noted  Neuro:  Speech is normal and fluent. Face is symmetric.     Moving all extremities well.   Psych: Awake. Alert.  Normal affect.  Appropriate interactions.    Emergency Department Course   Imaging:  Radiographic findings were communicated with the patient who voiced understanding of the findings.  US Pelvic Complete w Transvaginal & Abd/Pel Duplex  Normal pelvis.  As read by Radiology.     Abd/pelvis CT no contrast - Stone Protocol  1. There is a 0.5 cm left ureterovesical junction stone causing mild  hydronephrosis.  2. A few small right intrarenal stones.  As read by Radiology.    Laboratory:  CBC: WNL (WBC 8.7, HGB 14.8, )  BMP: Glucose 104 (H), Creatinine 1.23 (H), GFR estimate 60 (L)  UA: Urine bloo Large, Protein albumin 30, WBC/HPF 13 (H), RBC/HPF >182, Bacteria Many, Squamous epithelial/HPF 6 (H), Mucous Present, Amorphous crystals Few  HCG: Negative.    Interventions:  0150: Roxicodone 5 mg oral  0244: Zofran 4 mg IV     Emergency Department Course:  Past medical records, nursing notes, and vitals reviewed.  0142: I performed an exam of the patient and obtained history, as documented above.   Urine specimen obtained, findings as noted above.  Patient was given the above interventions while here in the emergency department.   The patient was sent for a US pelvic while in the emergency department, findings above.    I rechecked the patient and updated the patient regarding the findings.      Impression & Plan    Medical Decision Making:  Jyotsna Park is a 28 year old female who presents with left flank pain that woke her up from sleeping this evening.  A broad differential  diagnosis was considered including diverticulitis, ureterolithiasis, tumor, colitis, cholecystitis, aneurysm, dissection, volvulus, appendicitis, splenic issue, stomach pathology, ulcer, hydronephrosis, pneumonia, rib fracture, UTI, pyelonephritis, ectopic, ovarian cyst or torsion and pregnancy amongst many other etiologies.  Signs and symptoms consistent with ureterolithiasis. CT scan also reveals a 0.5 cm left ureterovesical junction stone. Patients pain is controlled in ED. No signs of infected stone.  Patient is hemodynamically stable in ED. Plan is home with abdominal pain recheck by primary care physician or return to ED if symptoms worsen. She will be given prescriptions for Zofran, Percocet and Flomax. Return for fevers greater than 102, increasing pain, other new symptoms develop.  Ureterolithiasis precautions for home.  Questions were answered.     Diagnosis:    ICD-10-CM   1. Kidney stone N20.0     Disposition:  discharged to home    Discharge Medications:  ondansetron 4 MG ODT tab  Commonly known as:  ZOFRAN ODT  4 mg, Oral, EVERY 8 HOURS PRN     oxyCODONE-acetaminophen 5-325 MG tablet  Commonly known as:  PERCOCET  1-2 tablets, Oral, EVERY 4 HOURS PRN     tamsulosin 0.4 MG capsule  Commonly known as:  FLOMAX  0.4 mg, Oral, DAILY       Johanna Morrison  12/27/2018   [facility] EMERGENCY DEPARTMENT  Johanna JIMÉNEZ Do, am serving as a scribe at 1:42 AM on 12/27/2018 to document services personally performed by Merna Blair MD based on my observations and the provider's statements to me.       Merna Blair MD  12/27/18 7066

## 2019-10-13 NOTE — LETTER
12/27/18      To Whom it may concern:    Nely Xavier was in our Emergency Department today, 12/27/18. with a patient who needed their assistance.  Please excuse them from work today.      Sincerely,    Zulma Tirado RN      
  December 27, 2018      To Whom It May Concern:      Jyotsna Park was seen in our Emergency Department today, 12/27/18.  I expect her condition to improve over the next 1-3 days.  She may return to work/school when improved.    Sincerely,  Gilda Tirado RN        
No

## 2019-12-09 ENCOUNTER — HEALTH MAINTENANCE LETTER (OUTPATIENT)
Age: 29
End: 2019-12-09

## 2020-03-15 ENCOUNTER — HEALTH MAINTENANCE LETTER (OUTPATIENT)
Age: 30
End: 2020-03-15

## 2021-01-15 ENCOUNTER — HEALTH MAINTENANCE LETTER (OUTPATIENT)
Age: 31
End: 2021-01-15

## 2021-10-24 ENCOUNTER — HEALTH MAINTENANCE LETTER (OUTPATIENT)
Age: 31
End: 2021-10-24

## 2022-02-13 ENCOUNTER — HEALTH MAINTENANCE LETTER (OUTPATIENT)
Age: 32
End: 2022-02-13

## 2022-10-15 ENCOUNTER — HEALTH MAINTENANCE LETTER (OUTPATIENT)
Age: 32
End: 2022-10-15

## 2023-01-10 ENCOUNTER — LAB REQUISITION (OUTPATIENT)
Dept: LAB | Facility: CLINIC | Age: 33
End: 2023-01-10
Payer: COMMERCIAL

## 2023-01-10 DIAGNOSIS — Z01.419 ENCOUNTER FOR GYNECOLOGICAL EXAMINATION (GENERAL) (ROUTINE) WITHOUT ABNORMAL FINDINGS: ICD-10-CM

## 2023-01-10 PROCEDURE — G0145 SCR C/V CYTO,THINLAYER,RESCR: HCPCS | Mod: ORL | Performed by: NURSE PRACTITIONER

## 2023-01-10 PROCEDURE — 87624 HPV HI-RISK TYP POOLED RSLT: CPT | Mod: ORL | Performed by: NURSE PRACTITIONER

## 2023-01-13 LAB
BKR LAB AP GYN ADEQUACY: NORMAL
BKR LAB AP GYN INTERPRETATION: NORMAL
BKR LAB AP HPV REFLEX: NORMAL
BKR LAB AP LMP: NORMAL
BKR LAB AP PREVIOUS ABNL DX: NORMAL
BKR LAB AP PREVIOUS ABNORMAL: NORMAL
PATH REPORT.COMMENTS IMP SPEC: NORMAL
PATH REPORT.COMMENTS IMP SPEC: NORMAL
PATH REPORT.RELEVANT HX SPEC: NORMAL

## 2023-01-17 LAB
HUMAN PAPILLOMA VIRUS 16 DNA: NEGATIVE
HUMAN PAPILLOMA VIRUS 18 DNA: NEGATIVE
HUMAN PAPILLOMA VIRUS FINAL DIAGNOSIS: ABNORMAL
HUMAN PAPILLOMA VIRUS OTHER HR: POSITIVE

## 2023-03-26 ENCOUNTER — HEALTH MAINTENANCE LETTER (OUTPATIENT)
Age: 33
End: 2023-03-26

## 2023-08-29 NOTE — NURSING NOTE
"Oncology Rooming Note    June 9, 2017 1:01 PM   Jyotsna Park is a 27 year old female who presents for:    Chief Complaint   Patient presents with     Oncology Clinic Visit     New liver mass      Initial Vitals: /66 (BP Location: Left arm, Patient Position: Chair, Cuff Size: Adult Regular)  Pulse 60  Temp 98.4  F (36.9  C) (Oral)  Resp 16  Wt 63.5 kg (139 lb 15.9 oz)  SpO2 95%  BMI 22.6 kg/m2 Estimated body mass index is 22.6 kg/(m^2) as calculated from the following:    Height as of 6/1/17: 1.676 m (5' 6\").    Weight as of this encounter: 63.5 kg (139 lb 15.9 oz). Body surface area is 1.72 meters squared.  No Pain (0) Comment: Data Unavailable   No LMP recorded. Patient is not currently having periods (Reason: Birth Control).  Allergies reviewed: Yes  Medications reviewed: Yes    Medications: Medication refills not needed today.  Pharmacy name entered into [facility]:    [facility] [address] [place], [place] - [address]  [facility] [address] [place], [place] - [address]    Clinical concerns: no concerns  Xavier was notified.    6  minutes for nursing intake (face to face time)     Sherrill Munoz MA              "
[facility]  Cardiovascular Disease  [address] [place] [address]  Phone: [phone]  Fax: [phone]  Follow Up Time: 1 week

## 2024-03-05 ENCOUNTER — LAB REQUISITION (OUTPATIENT)
Dept: LAB | Facility: CLINIC | Age: 34
End: 2024-03-05
Payer: COMMERCIAL

## 2024-03-05 DIAGNOSIS — Z01.419 ENCOUNTER FOR GYNECOLOGICAL EXAMINATION (GENERAL) (ROUTINE) WITHOUT ABNORMAL FINDINGS: ICD-10-CM

## 2024-03-05 PROCEDURE — 87624 HPV HI-RISK TYP POOLED RSLT: CPT | Mod: ORL | Performed by: NURSE PRACTITIONER

## 2024-03-05 PROCEDURE — G0145 SCR C/V CYTO,THINLAYER,RESCR: HCPCS | Mod: ORL | Performed by: NURSE PRACTITIONER

## 2024-03-28 ENCOUNTER — LAB REQUISITION (OUTPATIENT)
Dept: LAB | Facility: CLINIC | Age: 34
End: 2024-03-28
Payer: COMMERCIAL

## 2024-03-28 DIAGNOSIS — R87.619 UNSPECIFIED ABNORMAL CYTOLOGICAL FINDINGS IN SPECIMENS FROM CERVIX UTERI: ICD-10-CM

## 2024-03-28 PROCEDURE — 88305 TISSUE EXAM BY PATHOLOGIST: CPT | Mod: TC,ORL | Performed by: OBSTETRICS & GYNECOLOGY

## 2024-03-28 PROCEDURE — 88305 TISSUE EXAM BY PATHOLOGIST: CPT | Mod: 26 | Performed by: STUDENT IN AN ORGANIZED HEALTH CARE EDUCATION/TRAINING PROGRAM

## 2024-04-01 LAB
PATH REPORT.COMMENTS IMP SPEC: NORMAL
PATH REPORT.COMMENTS IMP SPEC: NORMAL
PATH REPORT.FINAL DX SPEC: NORMAL
PATH REPORT.GROSS SPEC: NORMAL
PATH REPORT.MICROSCOPIC SPEC OTHER STN: NORMAL
PATH REPORT.RELEVANT HX SPEC: NORMAL
PHOTO IMAGE: NORMAL

## 2025-01-03 NOTE — TELEPHONE ENCOUNTER
Panel Management Review      Patient has the following on her problem list:   Depression / Dysthymia review  No flowsheet data found.   Patient is due for:  None        Composite cancer screening  Chart review shows that this patient is due/due soon for the following Pap Smear  Summary:    Patient is due/failing the following:   PAP    Action needed:   Patient needs office visit for Pap.    Type of outreach:    Phone, left message for patient to call back.     Questions for provider review:    None                                                                                                                                    KENNEDY JOHNSON CMA       Chart routed to Care Team .           LT great toe pain denies injury

## 2025-04-10 ENCOUNTER — LAB REQUISITION (OUTPATIENT)
Dept: LAB | Facility: CLINIC | Age: 35
End: 2025-04-10
Payer: COMMERCIAL

## 2025-04-10 DIAGNOSIS — Z12.4 ENCOUNTER FOR SCREENING FOR MALIGNANT NEOPLASM OF CERVIX: ICD-10-CM

## 2025-04-10 PROCEDURE — G0145 SCR C/V CYTO,THINLAYER,RESCR: HCPCS | Mod: ORL | Performed by: OBSTETRICS & GYNECOLOGY

## 2025-04-10 PROCEDURE — 87624 HPV HI-RISK TYP POOLED RSLT: CPT | Mod: ORL | Performed by: OBSTETRICS & GYNECOLOGY

## 2025-04-11 LAB
HPV HR 12 DNA CVX QL NAA+PROBE: POSITIVE
HPV16 DNA CVX QL NAA+PROBE: NEGATIVE
HPV18 DNA CVX QL NAA+PROBE: NEGATIVE
HUMAN PAPILLOMA VIRUS FINAL DIAGNOSIS: ABNORMAL

## 2025-04-15 LAB
BKR AP ASSOCIATED HPV REPORT: NORMAL
BKR LAB AP GYN ADEQUACY: NORMAL
BKR LAB AP GYN INTERPRETATION: NORMAL
BKR LAB AP LMP: NORMAL
BKR LAB AP PREVIOUS ABNL DX: NORMAL
BKR LAB AP PREVIOUS ABNORMAL: NORMAL
PATH REPORT.COMMENTS IMP SPEC: NORMAL
PATH REPORT.COMMENTS IMP SPEC: NORMAL
PATH REPORT.RELEVANT HX SPEC: NORMAL